# Patient Record
Sex: FEMALE | Race: WHITE | Employment: STUDENT | URBAN - NONMETROPOLITAN AREA
[De-identification: names, ages, dates, MRNs, and addresses within clinical notes are randomized per-mention and may not be internally consistent; named-entity substitution may affect disease eponyms.]

---
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
## 2025-01-08 ENCOUNTER — OFFICE VISIT (OUTPATIENT)
Age: 20
End: 2025-01-08
Payer: COMMERCIAL

## 2025-01-08 VITALS
BODY MASS INDEX: 31.82 KG/M2 | WEIGHT: 191 LBS | RESPIRATION RATE: 18 BRPM | DIASTOLIC BLOOD PRESSURE: 72 MMHG | SYSTOLIC BLOOD PRESSURE: 126 MMHG | TEMPERATURE: 98.1 F | HEIGHT: 65 IN | OXYGEN SATURATION: 98 % | HEART RATE: 74 BPM

## 2025-01-08 DIAGNOSIS — R19.7 INTERMITTENT DIARRHEA: ICD-10-CM

## 2025-01-08 DIAGNOSIS — R63.39 PICKY EATER: ICD-10-CM

## 2025-01-08 DIAGNOSIS — F41.8 SITUATIONAL ANXIETY: Primary | ICD-10-CM

## 2025-01-08 PROCEDURE — 99213 OFFICE O/P EST LOW 20 MIN: CPT | Performed by: NURSE PRACTITIONER

## 2025-01-08 RX ORDER — BUSPIRONE HYDROCHLORIDE 10 MG/1
10 TABLET ORAL 2 TIMES DAILY PRN
Qty: 60 TABLET | Refills: 2 | Status: SHIPPED | OUTPATIENT
Start: 2025-01-08 | End: 2025-04-08

## 2025-01-08 SDOH — ECONOMIC STABILITY: FOOD INSECURITY: WITHIN THE PAST 12 MONTHS, YOU WORRIED THAT YOUR FOOD WOULD RUN OUT BEFORE YOU GOT MONEY TO BUY MORE.: NEVER TRUE

## 2025-01-08 SDOH — ECONOMIC STABILITY: FOOD INSECURITY: WITHIN THE PAST 12 MONTHS, THE FOOD YOU BOUGHT JUST DIDN'T LAST AND YOU DIDN'T HAVE MONEY TO GET MORE.: NEVER TRUE

## 2025-01-08 ASSESSMENT — PATIENT HEALTH QUESTIONNAIRE - PHQ9
1. LITTLE INTEREST OR PLEASURE IN DOING THINGS: NOT AT ALL
SUM OF ALL RESPONSES TO PHQ9 QUESTIONS 1 & 2: 0
SUM OF ALL RESPONSES TO PHQ QUESTIONS 1-9: 0
SUM OF ALL RESPONSES TO PHQ QUESTIONS 1-9: 0
2. FEELING DOWN, DEPRESSED OR HOPELESS: NOT AT ALL
SUM OF ALL RESPONSES TO PHQ QUESTIONS 1-9: 0
SUM OF ALL RESPONSES TO PHQ QUESTIONS 1-9: 0

## 2025-01-08 ASSESSMENT — ENCOUNTER SYMPTOMS
ABDOMINAL PAIN: 1
NAUSEA: 1
DIARRHEA: 1
RESPIRATORY NEGATIVE: 1

## 2025-01-08 NOTE — PROGRESS NOTES
SUBJECTIVE:    Patient ID: Dipti Esposito is a19 y.o. female.  Dipti Esposito is here today for Anxiety (Patient is here today for a 6 month follow up on her medication. Patient states she has stopped taking her anxiety medication. ) and 6 Month Follow-Up  .    HPI:   HPI     Pt is here today to discuss anxiety.  She states that she was forgetting lexapro and stopped it in November.  She had been forgetting several days in a row.  Is somewhat interested in as needed treatment.    Is having GI upset every single meal that is eaten in the dining uri[employer]egui at school.  Is having to use a meal plan because of requirements but cannot tolerate eating there related to abd cramping, diarrhea, nausea.  This is not happening with any other facility, only the dining uriostegui. No excess belching. Was not able to eat in the dining uriostegui the last month of school and no GI issues. She is also a very picky eater and is unable to find suitable foods easily.           History reviewed. No pertinent past medical history.  Prior to Visit Medications    Medication Sig Taking? Authorizing Provider   busPIRone (BUSPAR) 10 MG tablet Take 1 tablet by mouth 2 times daily as needed (anxiety) Yes Shira, Thelma, FLOR     No Known Allergies  Past Surgical History:   Procedure Laterality Date    LYMPH NODE DISSECTION      TONSILLECTOMY AND ADENOIDECTOMY       History reviewed. No pertinent family history.  Social History     Socioeconomic History    Marital status: Single     Spouse name: Not on file    Number of children: Not on file    Years of education: Not on file    Highest education level: Not on file   Occupational History    Not on file   Tobacco Use    Smoking status: Never    Smokeless tobacco: Never   Substance and Sexual Activity    Alcohol use: No    Drug use: No    Sexual activity: Never   Other Topics Concern    Not on file   Social History Narrative    Not on file     Social Determinants of Health     Financial Resource Strain: Low Risk

## 2025-09-02 ENCOUNTER — PATIENT MESSAGE (OUTPATIENT)
Age: 20
End: 2025-09-02

## 2025-09-02 DIAGNOSIS — D50.9 IRON DEFICIENCY ANEMIA, UNSPECIFIED IRON DEFICIENCY ANEMIA TYPE: ICD-10-CM

## 2025-09-03 RX ORDER — FERROUS SULFATE 325(65) MG
325 TABLET ORAL
Qty: 30 TABLET | Refills: 1 | Status: SHIPPED | OUTPATIENT
Start: 2025-09-03